# Patient Record
Sex: FEMALE | Race: WHITE | Employment: FULL TIME | URBAN - METROPOLITAN AREA
[De-identification: names, ages, dates, MRNs, and addresses within clinical notes are randomized per-mention and may not be internally consistent; named-entity substitution may affect disease eponyms.]

---

## 2024-10-22 PROBLEM — G89.29 CHRONIC PAIN OF RIGHT KNEE: Status: ACTIVE | Noted: 2024-10-22

## 2024-10-22 PROBLEM — M25.561 CHRONIC PAIN OF RIGHT KNEE: Status: ACTIVE | Noted: 2024-10-22

## 2024-10-22 NOTE — H&P
Orthopaedic Surgery New Patient Visit  _____________________________________________________________________________________________________  _____________________________________________________________________________________________________    DATE OF VISIT: 10/22/2024     CHIEF COMPLAINT:   Chief Complaint   Patient presents with    Knee Pain     Consult right knee pain 5-8/10. Onset a yr ago, but a month ago got worse. No injury.        HISTORY OF PRESENT ILLNESS: Teena Garcia is a 39 year old female who presents to the clinic for evaluation of her right knee.  She reports that this pain onset approximately 1 year ago without any specific traumatic onset.  About 1 month ago the pain worsened in her right knee.  It is primarily posterolateral in her knee.  She denies any mechanical symptoms but does have intermittent buckling of the knee.  She denies jose instability symptoms.  She has attempted cryotherapy, topical medications, and activity modification including use of a brace without any substantial improvement.  She denies any neurologic symptoms.  She is now interested in pursuing further intervention.    SOCIAL HISTORY  Social History     Socioeconomic History    Marital status: Single     Spouse name: Not on file    Number of children: Not on file    Years of education: Not on file    Highest education level: Not on file   Occupational History    Not on file   Tobacco Use    Smoking status: Not on file    Smokeless tobacco: Not on file   Substance and Sexual Activity    Alcohol use: Not on file    Drug use: Not on file    Sexual activity: Not on file   Other Topics Concern    Not on file   Social History Narrative    Not on file     Social Drivers of Health     Financial Resource Strain: Not on File (1/22/2024)    Received from [facility]    Financial Resource Strain     Financial Resource Strain: 0   Food Insecurity: Not on File (9/26/2024)    Received from [facility]    Food Insecurity     Food: 0    Transportation Needs: Not on File (2024)    Received from [facility]    Transportation Needs     Transportation: 0   Physical Activity: Not on File (2024)    Received from [facility]    Physical Activity     Physical Activity: 0   Stress: Not on File (2024)    Received from [facility]    Stress     Stress: 0   Social Connections: Not on File (9/15/2024)    Received from [facility]    Social Connections     Connectedness: 0   Housing Stability: Not on File (2024)    Received from [facility]    Housing Stability     [place]        PAST MEDICAL HISTORY  History reviewed. No pertinent past medical history.     PAST SURGICAL HISTORY  History reviewed. No pertinent surgical history.     MEDICATIONS  * Reviewed   Meloxicam 15 MG Oral Tab Take 1 tablet (15 mg total) by mouth daily. 30 tablet 0        ALLERGIES  Allergies[1]     FAMILY HISTORY  History reviewed. No pertinent family history.     REVIEW OF SYSTEMS  A 14 point review of systems was performed. Pertinent positives and negatives noted in the HPI.    PHYSICAL EXAM  There were no vitals taken for this visit.     Constitutional: The patient is well-developed, well-nourished, in no acute distress.  Neurological: Alert and oriented to person, place, and time.  Psychiatric: Mood and affect normal.  Head: Normocephalic and atraumatic.  Cardiovascular: regular rate by palpation  Pulmonary/Chest: Effort normal. No respiratory distress. Breathing non-labored  Abdominal: Abdomen exhibits no distension.   Right  KNEE  INSPECTION/PALPATION  No readily apparent visual abnormalities of the knee.   No ecchymosis or erythema  No effusion.   No breaks in skin. Skin is euthermic.  Neutral alignment on standing  Slightly antalgic gait  NTTP  to medial joint line, TTP to lateral joint line  ROM  0-130 degrees  KNEE STRENGTH  Flexion: 5/5  Extension: 5/5  STABILITY  1A lachman, stable anterior drawer  Stable posterior drawer  Stable to varus and valgus stress at 0 and 30 degrees  1  quadrant of lateral patellar translation  Negative J sign, negative apprehension  PERTINENT FINDINGS  Positive Faiza    Positive Thessaly  Equivocal  Patellar grind  SILT Eleno/Saph/SPN/DPN/T  2+ DP/PT pulse, foot wwp     RESULTS    No results found for: \"WBC\", \"HGB\", \"PLT\"   No results found for: \"GLU\", \"BUN\", \"CREATSERUM\", \"GFR\", \"GFRNAA\", \"GFRAA\"     IMAGING  I independently viewed and interpreted the imaging. Radiologist interpretation is available in the imaging report.  X-ray: Plain films of the right knee knee including AP, lateral, and sunrise weightbearing views were reviewed. These demonstrate no acute osseous abnormalities.  The patella is centrally positioned within the femoral trochlea.  There are minimal to no degenerative changes in the patellofemoral joint.  There are minimal to no degenerative changes in the medial knee compartment and minimal to no degenerative changes in the lateral knee compartment of the tibiofemoral joint.  There are no  loose bodies evident.  There is no evidence of Segond fracture or other fractures.     ASSESSMENT/PLAN: Teena Garcia is a 39 year old female who presents to the Orthopaedic surgery clinic today for right knee pain which is consistent on history and examination with likely lateral meniscus pathology.  There may be a component of a lateral chondral injury.  This pain has been present for nearly a year and has been progressively worsening and she would likely benefit from a course of physical therapy, however it would be prudent to obtain an MRI in order to determine the nature of the pathology/severity of the injury.  We did discuss further management options to include continued activity modification, anti-inflammatory medications, therapy, injections, and the potential necessity for operative intervention.  At this time, we will proceed with therapy, anti-inflammatories, and diagnostic imaging.     Discussed the history, physical exam, treatment to date,  and reviewed relevant imaging an studies with the patient.  WEIGHT BEARING STATUS: Weightbearing as tolerated  RANGE-OF-MOTION LIMITATIONS: as tolerated  NEW PRESCRIPTIONS:  We discussed medications for this condition including patient current regimen. Based on this discussion we have added/re-ordered meloxicam  IMAGING ORDERED: MRI right knee  CONSULTS PLACED: We discussed the role of therapy and/or additional specialty evaluation/intervention for this condition including previous/ongoing therapy and specialist services. Based on this discussion we have consulted physical therapy  PROSTHESES/ORTHOTICS: the patient does not need prostheses/orthoses for the current condition  PROCEDURES: none    FOLLOW-UP: after imaging    RADIOGRAPHS AT NEXT VISIT: none    I have personally seen Teena Garcia and discussed in detail their plan of care. Prior to departure, they indicated agreement with and understanding of their plan of care and their follow-up as documented herein this note. Please note that this note was written in combination with voice recognition/dictation software and there is a possibility of transcription errors which were not identified at the time of note submission. If clarification is necessary, please contact the author or clinic staff.    Lex Mac MD  Orthopaedic Surgery  10/22/2024         [1] Not on File

## 2024-11-19 ENCOUNTER — TELEPHONE (OUTPATIENT)
Dept: PHYSICAL THERAPY | Facility: HOSPITAL | Age: 39
End: 2024-11-19

## 2024-11-20 ENCOUNTER — OFFICE VISIT (OUTPATIENT)
Dept: PHYSICAL THERAPY | Age: 39
End: 2024-11-20
Attending: STUDENT IN AN ORGANIZED HEALTH CARE EDUCATION/TRAINING PROGRAM

## 2024-11-20 DIAGNOSIS — M25.561 CHRONIC PAIN OF RIGHT KNEE: Primary | ICD-10-CM

## 2024-11-20 DIAGNOSIS — G89.29 CHRONIC PAIN OF RIGHT KNEE: Primary | ICD-10-CM

## 2024-11-20 NOTE — PROGRESS NOTES
Patient arrived for PT evaluation, however, informed that without any insurance she will be self pay (via language line - for  [employer]).  Pt declined self payment option and no PT evaluation performed.  Pt provided financial assistance program info.      Time spent: 15 minutes  Charges: No charge

## 2024-11-22 ENCOUNTER — APPOINTMENT (OUTPATIENT)
Dept: PHYSICAL THERAPY | Age: 39
End: 2024-11-22
Attending: STUDENT IN AN ORGANIZED HEALTH CARE EDUCATION/TRAINING PROGRAM

## 2024-11-26 ENCOUNTER — APPOINTMENT (OUTPATIENT)
Dept: PHYSICAL THERAPY | Age: 39
End: 2024-11-26
Attending: STUDENT IN AN ORGANIZED HEALTH CARE EDUCATION/TRAINING PROGRAM

## 2024-12-02 ENCOUNTER — APPOINTMENT (OUTPATIENT)
Dept: PHYSICAL THERAPY | Age: 39
End: 2024-12-02
Attending: STUDENT IN AN ORGANIZED HEALTH CARE EDUCATION/TRAINING PROGRAM

## 2024-12-04 ENCOUNTER — APPOINTMENT (OUTPATIENT)
Dept: PHYSICAL THERAPY | Age: 39
End: 2024-12-04
Attending: STUDENT IN AN ORGANIZED HEALTH CARE EDUCATION/TRAINING PROGRAM

## 2024-12-09 ENCOUNTER — APPOINTMENT (OUTPATIENT)
Dept: PHYSICAL THERAPY | Age: 39
End: 2024-12-09
Attending: STUDENT IN AN ORGANIZED HEALTH CARE EDUCATION/TRAINING PROGRAM

## 2025-01-10 ENCOUNTER — TELEPHONE (OUTPATIENT)
Dept: PHYSICAL THERAPY | Facility: HOSPITAL | Age: 40
End: 2025-01-10

## 2025-01-14 ENCOUNTER — TELEPHONE (OUTPATIENT)
Dept: PHYSICAL THERAPY | Facility: HOSPITAL | Age: 40
End: 2025-01-14

## 2025-01-22 ENCOUNTER — APPOINTMENT (OUTPATIENT)
Dept: PHYSICAL THERAPY | Age: 40
End: 2025-01-22

## 2025-01-24 ENCOUNTER — APPOINTMENT (OUTPATIENT)
Dept: PHYSICAL THERAPY | Age: 40
End: 2025-01-24